# Patient Record
Sex: MALE
[De-identification: names, ages, dates, MRNs, and addresses within clinical notes are randomized per-mention and may not be internally consistent; named-entity substitution may affect disease eponyms.]

---

## 2019-01-10 ENCOUNTER — HOSPITAL ENCOUNTER (EMERGENCY)
Dept: HOSPITAL 31 - C.ER | Age: 39
Discharge: HOME | End: 2019-01-10
Payer: MEDICAID

## 2019-01-10 VITALS
SYSTOLIC BLOOD PRESSURE: 141 MMHG | RESPIRATION RATE: 18 BRPM | DIASTOLIC BLOOD PRESSURE: 97 MMHG | HEART RATE: 71 BPM | OXYGEN SATURATION: 100 % | TEMPERATURE: 98 F

## 2019-01-10 VITALS — BODY MASS INDEX: 28.6 KG/M2

## 2019-01-10 DIAGNOSIS — M54.5: Primary | ICD-10-CM

## 2019-01-10 NOTE — RAD
Date of service: 01/10/2019



PROCEDURE:  Radiographs of the Lumbar Spine.



HISTORY:

low back pain



COMPARISON:

None available.



FINDINGS:



BONES:

Alignment appears satisfactory. No listhesis. No acute displaced 

fracture identified.



DISC SPACES:

Unremarkable.



OTHER FINDINGS:

None.



IMPRESSION:

No acute displaced fracture or subluxation.

## 2019-01-10 NOTE — C.PDOC
History Of Present Illness


37 y/o male presents to the ED complaining of low back pain after lifting a 

heavy object at work 6 days ago. Reports hearing a cracking sound this morning, 

prompting him to come in. Patient denies any fever, chills, numbness, extremity 

weakness, urinary or fecal incontinence. No other complaints offered at this 

time. 





Time Seen by Provider: 01/10/19 13:57


Chief Complaint (Nursing): Back Pain


History Per: Patient


History/Exam Limitations: no limitations


Onset/Duration Of Symptoms: Days (x 6)


Current Symptoms Are (Timing): Still Present


Quality Of Discomfort: "Pain"


Associated Symptoms: None


Exacerbating Factor(s): Movement





Past Medical History


Reviewed: Historical Data, Nursing Documentation, Vital Signs


Vital Signs: 





                                Last Vital Signs











Temp  98 F   01/10/19 13:51


 


Pulse  71   01/10/19 13:51


 


Resp  18   01/10/19 13:51


 


BP  141/97 H  01/10/19 13:51


 


Pulse Ox  100   01/10/19 13:51














- Medical History


PMH: No Chronic Diseases


   Denies: Depression


Surgical History: No Surg Hx


Family History: States: Unknown Family Hx





- Social History


Hx Tobacco Use: No


Hx Alcohol Use: Yes


Hx Substance Use: No





- Immunization History


Hx Tetanus Toxoid Vaccination: Yes


Hx Influenza Vaccination: No





Review Of Systems


Except As Marked, All Systems Reviewed And Found Negative.


Constitutional: Negative for: Fever, Chills


Gastrointestinal: Negative for: Nausea, Vomiting, Abdominal Pain


Genitourinary: Negative for: Dysuria, Incontinence, Hematuria


Musculoskeletal: Positive for: Back Pain


Skin: Negative for: Rash


Neurological: Negative for: Weakness, Numbness





Physical Exam





- Physical Exam


Appears: Non-toxic, No Acute Distress


Skin: Normal Color, Warm, Dry


Head: Atraumatic, Normacephalic


Eye(s): bilateral: Normal Inspection, PERRL, EOMI


Neck: Normal ROM


Chest: Symmetrical


Respiratory: No Accessory Muscle Use, Other (No respiratory distress)


Gastrointestinal/Abdominal: Soft, No Tenderness, No Distention, No Guarding


Back: No CVA Tenderness, Paraspinal Tenderness (to paralumbar region)


Extremity: Normal ROM, No Calf Tenderness, No Deformity, No Swelling


Pulses: Left Dorsalis Pedis: Normal, Right Dorsalis Pedis: Normal


Neurological/Psych: Oriented x3





ED Course And Treatment


O2 Sat by Pulse Oximetry: 100 (RA)


Pulse Ox Interpretation: Normal





- Other Rad


  ** x-ray LS spine


X-Ray: Read By Radiologist


Interpretation: Accession No. : [id number].  Patient Name / ID : EVELIN DIALLO  / [id number].  Exam Date : 01/10/2019 14:09:29 ( Approved ).  Study 

Comment :  Sex / Age : M  / 38Y.  Creator : Mitzi Woodard MD.  Dictator :

Mitzi Woodard MD.   :  Approver : Mitzi Woodard MD.  

Approver2 :  Report Date : 01/10/2019 14:25:13.  My Comment :  

*********************************

**************************************************.  Date of service: 

01/10/2019.  PROCEDURE:  Radiographs of the Lumbar Spine.  HISTORY:  low back 

pain.  COMPARISON:  None available.  FINDINGS:  BONES:  Alignment appears 

satisfactory. No listhesis. No acute displaced fracture identified.  DISC 

SPACES:  Unremarkable.  OTHER FINDINGS:  None.  IMPRESSION:  No acute displaced 

fracture or subluxation.





Medical Decision Making


Medical Decision Making: 





Impression: Low back pain susepct msk pain 


Plan:


--550 mg PO naproxen


--10 mg PO flexeril


--LS spine x-ray





Imaging reviewed and discussed with patient. 


xr neg neuro intact. no saddle anesthesia no urinary symptoms neuro intact. 

advise outptu fu





Disposition





- Disposition


Referrals: 


[facility] [Outside]


[facility] [Outside]


Roberto Sandhu MD [Non-Staff] - 


Disposition: HOME/ ROUTINE


Disposition Time: 14:00


Condition: STABLE


Additional Instructions: 


followup with specialist. return to er with worsening symptoms or concerns. you 

will further testing as an outpatient. 


Prescriptions: 


Cyclobenzaprine [Cyclobenzaprine HCl] 10 mg PO DAILY PRN #10 tab


 PRN Reason: Muscle Spasm


RX: Naproxen 500 mg PO BID PRN #14 tab


 PRN Reason: Pain, Mild (1-3)


Instructions:  Low Back Pain  (DC)


Forms:  [facility] (English)


Print Language: Telugu





- Clinical Impression


Clinical Impression: 


 Low back pain








- Scribe Statement


The provider has reviewed the documentation as recorded by the Talisha Yang





Provider Attestation: 


All medical record entries made by the Talisha were at my direction and 

personally dictated by me. I have reviewed the chart and agree that the record 

accurately reflects my personal performance of the history, physical exam, 

medical decision making, and the department course for this patient. I have also

personally directed, reviewed, and agree with the discharge instructions and 

disposition.